# Patient Record
(demographics unavailable — no encounter records)

---

## 2025-02-03 NOTE — HISTORY OF PRESENT ILLNESS
[de-identified] : 61 y/o F with one month of left ear fullness and decreased hearing.  No vertigo, Pos tinnitus, worse when laying down.  Does get some pulsatile tinnitus when lying flat.  No d/c or Pain.  No Q-tip use.  No nasal congestion or throat c/o. no other modifying factors no other nasal or throat complaints

## 2025-02-03 NOTE — END OF VISIT
[FreeTextEntry3] : I personally saw and examined FRANCOISE ESPINOSA in detail.I have made changes in changes in the body of the note where appropriate. I personally reviewed the HPI, PMH, FH, SH, ROS and medications/allergies. I have spoken to CAL Chin regarding the history and have personally determined the assessment and plan of care and documented this myself.. I performed all procedures and performed the physical exam. I have made changes in the body of the note where appropriate.   Attesting Faculty: See Attending Signature Below

## 2025-02-03 NOTE — REASON FOR VISIT
[Initial Evaluation] : an initial evaluation for [FreeTextEntry2] : Left ear fullness and decreased hearing.

## 2025-02-03 NOTE — ASSESSMENT
[FreeTextEntry1] : Left ear fullness and muffled hearing with b/l cerumen impaction: - Cerumen removed b/l - Feels hearing is improved.   - F/U 6 months.

## 2025-02-03 NOTE — PHYSICAL EXAM
[Midline] : trachea located in midline position [de-identified] : b/l cerumen -sever [Normal] : no rashes